# Patient Record
Sex: MALE | Race: WHITE | Employment: OTHER | URBAN - METROPOLITAN AREA
[De-identification: names, ages, dates, MRNs, and addresses within clinical notes are randomized per-mention and may not be internally consistent; named-entity substitution may affect disease eponyms.]

---

## 2019-10-28 ENCOUNTER — APPOINTMENT (OUTPATIENT)
Dept: ULTRASOUND IMAGING | Facility: HOSPITAL | Age: 84
DRG: 603 | End: 2019-10-28
Attending: EMERGENCY MEDICINE
Payer: MEDICARE

## 2019-10-28 ENCOUNTER — HOSPITAL ENCOUNTER (INPATIENT)
Facility: HOSPITAL | Age: 84
LOS: 3 days | Discharge: HOME HEALTH | DRG: 603 | End: 2019-10-31
Attending: EMERGENCY MEDICINE | Admitting: FAMILY MEDICINE
Payer: MEDICARE

## 2019-10-28 DIAGNOSIS — L03.116 CELLULITIS OF LEFT LOWER EXTREMITY: Primary | ICD-10-CM

## 2019-10-28 PROCEDURE — 96365 THER/PROPH/DIAG IV INF INIT: CPT

## 2019-10-28 PROCEDURE — 96367 TX/PROPH/DG ADDL SEQ IV INF: CPT

## 2019-10-28 PROCEDURE — 96375 TX/PRO/DX INJ NEW DRUG ADDON: CPT

## 2019-10-28 PROCEDURE — 96366 THER/PROPH/DIAG IV INF ADDON: CPT

## 2019-10-28 PROCEDURE — 36415 COLL VENOUS BLD VENIPUNCTURE: CPT

## 2019-10-28 PROCEDURE — 93971 EXTREMITY STUDY: CPT | Performed by: EMERGENCY MEDICINE

## 2019-10-28 PROCEDURE — 80048 BASIC METABOLIC PNL TOTAL CA: CPT | Performed by: EMERGENCY MEDICINE

## 2019-10-28 PROCEDURE — 84484 ASSAY OF TROPONIN QUANT: CPT | Performed by: EMERGENCY MEDICINE

## 2019-10-28 PROCEDURE — 85025 COMPLETE CBC W/AUTO DIFF WBC: CPT | Performed by: EMERGENCY MEDICINE

## 2019-10-28 PROCEDURE — 85025 COMPLETE CBC W/AUTO DIFF WBC: CPT

## 2019-10-28 PROCEDURE — 87040 BLOOD CULTURE FOR BACTERIA: CPT | Performed by: EMERGENCY MEDICINE

## 2019-10-28 PROCEDURE — 99285 EMERGENCY DEPT VISIT HI MDM: CPT

## 2019-10-28 PROCEDURE — 80048 BASIC METABOLIC PNL TOTAL CA: CPT

## 2019-10-28 RX ORDER — MELATONIN
1000 DAILY
COMMUNITY

## 2019-10-28 RX ORDER — DIPHENHYDRAMINE HYDROCHLORIDE 50 MG/ML
25 INJECTION INTRAMUSCULAR; INTRAVENOUS ONCE
Status: COMPLETED | OUTPATIENT
Start: 2019-10-28 | End: 2019-10-28

## 2019-10-28 RX ORDER — ACETAMINOPHEN 160 MG
2000 TABLET,DISINTEGRATING ORAL DAILY
COMMUNITY

## 2019-10-28 RX ORDER — DUTASTERIDE 0.5 MG/1
0.5 CAPSULE, LIQUID FILLED ORAL DAILY
COMMUNITY

## 2019-10-28 RX ORDER — TAMSULOSIN HYDROCHLORIDE 0.4 MG/1
0.4 CAPSULE ORAL EVERY EVENING
COMMUNITY

## 2019-10-28 NOTE — ED PROVIDER NOTES
Patient Seen in: [facility] AND [facility] Emergency Department      History   Patient presents with:  Cellulitis (integumentary, infectious)    Stated Complaint: left leg cellulitis    HPI    82-year-old male presenting for evaluation of left lower cavity cellu and PT 2+  Pulmonary:      Effort: Pulmonary effort is normal.      Breath sounds: Normal breath sounds. Abdominal:      General: There is no distension. Palpations: Abdomen is soft. Tenderness: There is no tenderness.    Musculoskeletal: Normal 10/28/2019 at 19:47     Approved by (CST): Nestor Hogue MD on 10/28/2019 at 19:48                  MDM   82-year-old male with left lower extremity cellulitis which is progressively worsening despite outpatient antibiotics. DVT study negative.   Plan to

## 2019-10-28 NOTE — ED INITIAL ASSESSMENT (HPI)
Pt presents to ED with redness and swelling to left foot going up ankle and calf.  Pt states last Wednesday he was seen by a podiatrist who opened up \"some spot between the toes\" and applied cream. Ever since the redness has been spreading up patients priscilla

## 2019-10-29 PROCEDURE — 93010 ELECTROCARDIOGRAM REPORT: CPT | Performed by: FAMILY MEDICINE

## 2019-10-29 PROCEDURE — 85025 COMPLETE CBC W/AUTO DIFF WBC: CPT | Performed by: FAMILY MEDICINE

## 2019-10-29 PROCEDURE — 93005 ELECTROCARDIOGRAM TRACING: CPT

## 2019-10-29 PROCEDURE — 80061 LIPID PANEL: CPT | Performed by: FAMILY MEDICINE

## 2019-10-29 PROCEDURE — 84443 ASSAY THYROID STIM HORMONE: CPT | Performed by: FAMILY MEDICINE

## 2019-10-29 PROCEDURE — 84439 ASSAY OF FREE THYROXINE: CPT | Performed by: FAMILY MEDICINE

## 2019-10-29 PROCEDURE — 80053 COMPREHEN METABOLIC PANEL: CPT | Performed by: FAMILY MEDICINE

## 2019-10-29 PROCEDURE — 99212 OFFICE O/P EST SF 10 MIN: CPT

## 2019-10-29 RX ORDER — ACETAMINOPHEN 500 MG
500 TABLET ORAL EVERY 4 HOURS PRN
Status: DISCONTINUED | OUTPATIENT
Start: 2019-10-29 | End: 2019-10-31

## 2019-10-29 RX ORDER — DIPHENHYDRAMINE HCL 25 MG
25 CAPSULE ORAL EVERY 8 HOURS
Status: DISCONTINUED | OUTPATIENT
Start: 2019-10-29 | End: 2019-10-31

## 2019-10-29 RX ORDER — DEXTROSE, SODIUM CHLORIDE, AND POTASSIUM CHLORIDE 5; .45; .15 G/100ML; G/100ML; G/100ML
INJECTION INTRAVENOUS CONTINUOUS
Status: DISCONTINUED | OUTPATIENT
Start: 2019-10-29 | End: 2019-10-31

## 2019-10-29 RX ORDER — ALFUZOSIN HYDROCHLORIDE 10 MG/1
10 TABLET, EXTENDED RELEASE ORAL NIGHTLY
Status: DISCONTINUED | OUTPATIENT
Start: 2019-10-29 | End: 2019-10-31

## 2019-10-29 RX ORDER — HEPARIN SODIUM 5000 [USP'U]/ML
5000 INJECTION, SOLUTION INTRAVENOUS; SUBCUTANEOUS EVERY 12 HOURS SCHEDULED
Status: DISCONTINUED | OUTPATIENT
Start: 2019-10-29 | End: 2019-10-31

## 2019-10-29 RX ORDER — FINASTERIDE 5 MG/1
5 TABLET, FILM COATED ORAL DAILY
Status: DISCONTINUED | OUTPATIENT
Start: 2019-10-29 | End: 2019-10-31

## 2019-10-29 NOTE — PROGRESS NOTES
[facility] Dosing Service  Antibiotic Dosing    Karli Ruth is a 82year old male for whom pharmacy is dosing Zosyn for treatment of  cellulitis. .  Other antibiotics (Not dosed by pharmacy): Allergies: is allergic to penicillins.     Vitals: BP

## 2019-10-29 NOTE — CONSULTS
[facility] ID CONSULT NOTE    Thaddeus Mata Patient Status:  Inpatient    3/27/1935 MRN [id number]   Location [facility] 5SW/SE Attending Serenity Beckford MD   Hosp Day # 1 PCP Jerel Kaplan MD       Reason fo 25 mg, Oral, Q8H  •  dextrose 5 % and 0.45 % NaCl with KCl 20 mEq infusion, , Intravenous, Continuous  •  Vancomycin HCl (VANCOCIN) 1,250 mg in sodium chloride 0.9% 500 mL IVPB, 1,250 mg, Intravenous, Q24H  •  influenza vaccine (PF)(FLUZONE HD) high dose f 10/28/19  1652 10/29/19  0613   * 85   BUN 29* 23*   CREATSERUM 0.98 1.05   GFRAA 83 76   GFRNAA 72 66   CA 9.3 8.5   ALB  --  2.6*    143   K 4.3 3.9    112   CO2 30.0 29.0   ALKPHO  --  67   AST  --  17   ALT  --  12*   BILT  --  0.7

## 2019-10-29 NOTE — PROGRESS NOTES
[place] [facility] - [facility]    Progress Note    Leia Wilkinson Patient Status:  Inpatient    3/27/1935 MRN [id number]   Location [facility] 5SW/SE Attending Henry Harkins MD   Hosp Day # 1 PCP Francine Mondragon MD       Subjective:    Argelia Wong 10/29/2019    ALT 12 (L) 10/29/2019    T4F 1.4 10/29/2019    TSH 1.140 10/29/2019    TROP <0.045 10/28/2019       No procedure found.     Us Venous Doppler Leg Left - Diag Img (cpt=93971)    Result Date: 10/28/2019  CONCLUSION:   No evidence of left lower e

## 2019-10-29 NOTE — H&P
[facility]    PATIENT'S NAME: Jose Francisco Brianchilla   ATTENDING PHYSICIAN: Noemy Singh MD   PATIENT ACCOUNT#:   [id number]    LOCATION:  [place] [address]  MEDICAL RECORD #:   [id number]       YOB: 1935  ADMISSION DATE:       10/28/2 VITAL SIGNS:  Blood pressure 146/73, pulse 70, respirations 16, temperature 98.7 degrees Fahrenheit. HEENT:  Pupils equally reactive, round to light. Extraocular movements were intact. Mucosa was moist.  NECK:  No masses.   LUNGS:  Clear to auscultatio

## 2019-10-29 NOTE — WOUND PROGRESS NOTE
WOUND CARE NOTE    History:  History reviewed. No pertinent past medical history. History reviewed. No pertinent surgical history.    Social History    Tobacco Use      Smoking status: Never Smoker      Smokeless tobacco: Never Used    Alcohol use: Not o 10/29/2019    CA 8.5 10/29/2019    ALB 2.6 (L) 10/29/2019    ALKPHO 67 10/29/2019    BILT 0.7 10/29/2019    TP 6.3 (L) 10/29/2019    AST 17 10/29/2019    ALT 12 (L) 10/29/2019     No results found for: PREALBUMIN      Time Spent 30 Minutes.     Varsha Mae

## 2019-10-29 NOTE — ED NOTES
This RN answered patients call light. Patient pointed out a new rash to his left forearm and groin. +ithcing. Denies difficulty breathing or SOB. Speaking in full sentences. Patient oxygen saturation at 98% on room air. . MD Giles notified.

## 2019-10-29 NOTE — PROGRESS NOTES
[facility] Dosing Service    Initial Pharmacokinetic Consult for Vancomycin Dosing     Randy Bravo is a 82year old male who is being treated for cellulitis.   Pharmacy has been asked to dose Vancomycin by Dr. Solano Greek    He is allergic to penicilli

## 2019-10-29 NOTE — ED NOTES
Orders for admission, patient is aware of plan and ready to go upstairs.  Any questions, please call ED RN Bacilio Quiroga  at [address]

## 2019-10-29 NOTE — PLAN OF CARE
Problem: Patient Centered Care  Goal: Patient preferences are identified and integrated in the patient's plan of care  Description  Interventions:  - What would you like us to know as we care for you?   - Provide timely, complete, and accurate informatio guidelines  Outcome: Progressing     Problem: SAFETY ADULT - FALL  Goal: Free from fall injury  Description  INTERVENTIONS:  - Assess pt frequently for physical needs  - Identify cognitive and physical deficits and behaviors that affect risk of falls.   - I isolation precautions as appropriate  - Initiate Pressure Ulcer prevention bundle as indicated  Outcome: Progressing   Patient informed on plan of care and all questions answered to the best of my ability.  Call light within reach, bed locked, and belonging

## 2019-10-29 NOTE — DIETARY NOTE
ADULT NUTRITION INITIAL ASSESSMENT    Pt is at moderate nutrition risk. Pt does not meet malnutrition criteria.       RECOMMENDATIONS TO MD:  See Nutrition Intervention     NUTRITION DIAGNOSIS/PROBLEM:  Unintentional weight loss related to decreased abilit MEDICAL HISTORY:  has no past medical history on file. ANTHROPOMETRICS:  HT: 180.3 cm (5' 11\")       WT: 63.4 kg (139 lb 11.2 oz)  True wt likely slightly lower d/t edema. BMI: Body mass index is 19.48 kg/m². -low for geriatric population.        B needs, labs WNL and promote healing    DIETITIAN FOLLOW UP:  RD to follow up within 7 days  Royce Vela, [address], [address] [place] [address]        Clinical Dietitian  [phone]

## 2019-10-30 NOTE — PAYOR COMM NOTE
--------------  ADMISSION REVIEW     [place]or: [place] #:  [id number]  Authorization Number: [id number]    ED Provider Notes             Patient Seen in: [facility] Emergency Department      History   Patient presents with 2+  Pulmonary:      Effort: Pulmonary effort is normal.      Breath sounds: Normal breath sounds. Abdominal:      General: There is no distension. Palpations: Abdomen is soft. Tenderness: There is no tenderness.    Musculoskeletal: Normal range 10/28/2019 at 19:47     Approved by (CST): Damien Toscano MD on 10/28/2019 at 19:48              Disposition and Plan     Clinical Impression:  Cellulitis of left lower extremity  (primary encounter diagnosis)                   H&P - H&P Note        CHIEF EXTREMITIES:  No cyanosis or clubbing. He had 2 to 3+ edema of the left foot with 2+ edema going up the lower leg, erythema coming up from the toes all the way to the knee, and an open blister with clear drainage posterior lower calf.     NEUROLOGIC:  Al prior to admission. On admission above labs, vitals, imaging findings.     Exam awake, alert, in chair, no acute distress. EOMI. No oral lesions. Neck is supple. Lungs clear. Abdomen soft, nontender. Left DP pulse palpable.   Left lower extremity corby

## 2019-10-30 NOTE — PROGRESS NOTES
[facility]    Progress Note    Ashley Ngo Patient Status:  Inpatient    3/27/1935 MRN [id number]   Location [facility] 5SW/SE Attending Silvano Meneses MD   Hosp Day # 2 PCP Jack Edmonds MD       Subjective:    Ema Dumont 10/29/2019    AST 17 10/29/2019    ALT 12 (L) 10/29/2019    T4F 1.4 10/29/2019    TSH 1.140 10/29/2019    TROP <0.045 10/28/2019       No procedure found.     Us Venous Doppler Leg Left - Diag Img (cpt=93971)    Result Date: 10/28/2019  CONCLUSION:   No benito

## 2019-10-30 NOTE — PLAN OF CARE
Problem: Patient Centered Care  Goal: Patient preferences are identified and integrated in the patient's plan of care  Description  Interventions:  - What would you like us to know as we care for you?  From home with daughter  - Provide timely, complete, as appropriate  Outcome: Progressing     Problem: RISK FOR INFECTION - ADULT  Goal: Absence of fever/infection during anticipated neutropenic period  Description  INTERVENTIONS  - Monitor WBC  - Administer growth factors as ordered  - Implement neutropenic infection  Description  INTERVENTIONS:  - Assess and document risk factors for pressure ulcer development  - Assess and document skin integrity  - Assess and document dressing/incision, wound bed, drain sites and surrounding tissue  - Implement wound care

## 2019-10-30 NOTE — PROGRESS NOTES
[facility] ID PROGRESS NOTE    Vignesh Collier Patient Status:  Inpatient    3/27/1935 MRN [id number]   Location [facility] 5SW/SE Attending Judy Tamayo MD   Hosp Day # 2 PCP Roopa Mustafa MD     Subjective:  Awake, has been keeping legs eleva started on vancomycin and zosyn. States redness and improved slightly since yesterday.  ID consulted.     # Acute LLE cellulitis, failed outpatient PO antibiotics versus dermatitis with pruritis               -Venous doppler without DVT    -Blood cx NGTD  #

## 2019-10-31 VITALS
DIASTOLIC BLOOD PRESSURE: 55 MMHG | BODY MASS INDEX: 19.56 KG/M2 | TEMPERATURE: 97 F | SYSTOLIC BLOOD PRESSURE: 126 MMHG | HEIGHT: 71 IN | HEART RATE: 65 BPM | WEIGHT: 139.69 LBS | RESPIRATION RATE: 17 BRPM | OXYGEN SATURATION: 98 %

## 2019-10-31 PROCEDURE — 90471 IMMUNIZATION ADMIN: CPT

## 2019-10-31 PROCEDURE — 85025 COMPLETE CBC W/AUTO DIFF WBC: CPT | Performed by: FAMILY MEDICINE

## 2019-10-31 PROCEDURE — 80048 BASIC METABOLIC PNL TOTAL CA: CPT | Performed by: FAMILY MEDICINE

## 2019-10-31 RX ORDER — DOXYCYCLINE HYCLATE 100 MG/1
100 CAPSULE ORAL 2 TIMES DAILY
Qty: 14 CAPSULE | Refills: 0 | Status: SHIPPED | OUTPATIENT
Start: 2019-10-31

## 2019-10-31 RX ORDER — CEFADROXIL 500 MG/1
1 CAPSULE ORAL 2 TIMES DAILY
Qty: 14 CAPSULE | Refills: 0 | Status: SHIPPED | OUTPATIENT
Start: 2019-10-31 | End: 2019-11-10

## 2019-10-31 NOTE — CM/SW NOTE
ASHLEE received MDO for [place] consult. Pt was d/c prior to interview with ASHLEE.    ASHLEE informed [facility] of Matt Lubin request.  Face to face order placed.     OTIS [place], [place]  Social Work   UUE:#[id number]

## 2019-10-31 NOTE — DISCHARGE SUMMARY
[facility]    PATIENT'S NAME: Shitalyanet Baker PHYSICIAN: Mitali Cali MD   PATIENT ACCOUNT#:   [de-identified]    LOCATION:  [address] [place] [address] Dr. Hernandez [facility] RECORD #:   [id number]       YOB: 1935  ADMISSION DATE:       10/2 shortness of breath. No nausea, vomiting, or diarrhea. No abdominal pain. No headache, no fever, no cough, no sore throats. No dysuria.       PHYSICAL EXAMINATION:    GENERAL APPEARANCE:  At time of admission, an 28-year-old white male, alert, in mild d

## 2019-10-31 NOTE — PROGRESS NOTES
[facility]OSP - [facility]    Progress Note    Benjamin Watkins Patient Status:  Inpatient    3/27/1935 MRN [id number]   Location [facility] 5SW/SE Attending Maria Dolores Parikh MD   Hosp Day # 3 PCP Germain Hale MD       Subjective:    Cory Henao .0 10/31/2019    CREATSERUM 0.84 10/31/2019    BUN 18 10/31/2019     10/31/2019    K 4.0 10/31/2019     10/31/2019    CO2 27.0 10/31/2019    GLU 97 10/31/2019    CA 8.3 (L) 10/31/2019    ALB 2.6 (L) 10/29/2019    ALKPHO 67 10/29/2019

## 2019-10-31 NOTE — CM/SW NOTE
10/31/19 1100   Discharge disposition   Expected discharge disposition Home-Health   Name of Facillity/Home Care/Hospice [facility]   Outpatient services   (wound care)   Home services after discharge Skilled home care       Carmela Olsen, MSW, LSW  So

## 2019-10-31 NOTE — HOME CARE LIAISON
Update: [facility] spoke with intake RN Tonia Orellana who states patient was accepted and will be seen today. Received referral from [address] for home health services.  Residential does not service [facility] and would not be able to see for WAYNE ROBBINS
full weight-bearing

## 2019-10-31 NOTE — PLAN OF CARE
Problem: Patient Centered Care  Goal: Patient preferences are identified and integrated in the patient's plan of care  Description  Interventions:  - What would you like us to know as we care for you?  From home with daughter  - Provide timely, complete, appropriate  Outcome: Completed     Problem: RISK FOR INFECTION - ADULT  Goal: Absence of fever/infection during anticipated neutropenic period  Description  INTERVENTIONS  - Monitor WBC  - Administer growth factors as ordered  - Implement neutropenic guid infection  Description  INTERVENTIONS:  - Assess and document risk factors for pressure ulcer development  - Assess and document skin integrity  - Assess and document dressing/incision, wound bed, drain sites and surrounding tissue  - Implement wound care

## 2019-10-31 NOTE — PROGRESS NOTES
[facility] ID PROGRESS NOTE    [facility] Patient Status:  Inpatient    3/27/1935 MRN [id number]   Location [facility] 5SW/SE Attending Raj Dutton MD   Hosp Day # 3 PCP Shana Szymanski MD     Subjective:  Awake, has been walking without pa started on vancomycin and zosyn. States redness and improved slightly since yesterday.  ID consulted.     # Acute LLE cellulitis, failed outpatient PO antibiotics versus dermatitis with pruritis               -Venous doppler without DVT    -Blood cx NGTD  #

## 2019-11-01 NOTE — PAYOR COMM NOTE
--------------  DISCHARGE REVIEW    Payor: [facility] Elan Morgan Kul[place] #:  [id number]  Authorization Number: [id number]    Admit date: 10/28/19  Admit time:  4010  Discharge Date: 10/31/2019 11:03 AM     Admitting Physician: Reny Diaz MD